# Patient Record
Sex: FEMALE | URBAN - METROPOLITAN AREA
[De-identification: names, ages, dates, MRNs, and addresses within clinical notes are randomized per-mention and may not be internally consistent; named-entity substitution may affect disease eponyms.]

---

## 2017-08-30 ENCOUNTER — EMERGENCY (EMERGENCY)
Facility: HOSPITAL | Age: 37
LOS: 1 days | Discharge: HOME | End: 2017-08-30
Attending: EMERGENCY MEDICINE | Admitting: EMERGENCY MEDICINE
Payer: COMMERCIAL

## 2017-08-30 VITALS
SYSTOLIC BLOOD PRESSURE: 144 MMHG | RESPIRATION RATE: 18 BRPM | TEMPERATURE: 99 F | HEART RATE: 92 BPM | OXYGEN SATURATION: 98 % | DIASTOLIC BLOOD PRESSURE: 98 MMHG

## 2017-08-30 PROCEDURE — 73030 X-RAY EXAM OF SHOULDER: CPT

## 2017-08-30 PROCEDURE — 99284 EMERGENCY DEPT VISIT MOD MDM: CPT | Mod: 25

## 2017-08-30 PROCEDURE — 73060 X-RAY EXAM OF HUMERUS: CPT

## 2017-08-30 PROCEDURE — 73030 X-RAY EXAM OF SHOULDER: CPT | Mod: 26,LT

## 2017-08-30 PROCEDURE — 73060 X-RAY EXAM OF HUMERUS: CPT | Mod: 26,LT

## 2017-08-30 RX ORDER — IBUPROFEN 200 MG
600 TABLET ORAL ONCE
Refills: 0 | Status: COMPLETED | OUTPATIENT
Start: 2017-08-30 | End: 2017-08-30

## 2017-08-30 RX ADMIN — Medication 600 MILLIGRAM(S): at 05:09

## 2017-08-30 NOTE — ED PROVIDER NOTE - MUSCULOSKELETAL MINIMAL EXAM
L shoulder pain with active and passive ROM, no obviouis bruising, distal pulses intact, sensation intact, difficulty abducting shoulder

## 2017-08-30 NOTE — ED PROVIDER NOTE - ATTENDING CONTRIBUTION TO CARE
pt with L shoulder pain s/p fall yesterday after she tripped and fell. did not take pain meds  on exam, no point tenderness, able to range shoulder into internal and external rotation and abduction but pain with abduction and internal rotation. No crepitus.   xray wet read without fx or dislocation   pt advised to keep arm in sling for comfort, ice, rest, and fu with sports med or ortho for likely MRI for suspected soft tissue injury.

## 2017-08-30 NOTE — ED ADULT NURSE NOTE - OBJECTIVE STATEMENT
Received patient awake and alert x 4, Presents with left arm injury, s/p slip and fall outside yesterday afternoon and landed on her left arm, pain has been progressively worsening, not relieved by pain medication. At this time, verbalized pain 8/10 on pain scale. No visible trauma noted, safety maintained, will continue to monitor.

## 2017-08-30 NOTE — ED PROVIDER NOTE - OBJECTIVE STATEMENT
36 F h/o gatroparesis, hypothyroid, s/p slip and fall outside yesterday afternoon. Had L upper arm pain that has worsened since then. No numbenss/tinling in arm. Pain is worse at shoulder, with difficulty ranging 2/2 pain. No other injuries. No back pain/neck pain/head trauma.

## 2018-03-06 NOTE — ED ADULT NURSE NOTE - NS ED NOTE ABUSE RESPONSE YN
Psychiatric Assessment    Patient: Sonam Busch Date: 3/6/2018   : 1981 Attending: No att. providers found   36 year old female      Chief complaint: \"I feel like my depression is back to where is started\"    History of Presenting Illness: Patient is a 36 year old female who presents to the [facility] clinic for an initial psychiatric evaluation with medication management. She will see Zarijuventino Palomino on 2018 as her new provider, is currently in therapy with Missy Robert.     For the past year patient is reporting increased depression and anxiety. Symptoms have worsened over the past 6 months related to her new job. 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 She has been off work for the past few weeks related to increased anxiety and panic attacks. Patient reports panic attacks have increased over the past few months related to her job and the upcoming test.     Depression symptoms include: lack of energy, some anhedonia, worry, some isolative behaviors, feeling sad/depressed. Current rating is 5/10 (10 being worst). She is taking citalopram 10 mg daily for depression and anxiety, originally saw some benefit when starting the medication, does not feel it is as beneficial now, has been taking since . Denies specific triggers or times that depression is worse, does feel her failing her test the first time is a contributor but not a trigger.    Anxiety symptoms have increased over the past 6 months. Patient is questioning her job which was created for her, she feels she never stops thinking about work even when out with friends or doing something else. 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 She is taking lorazepam 0.5 mg BID PRN for anxiety, feels this is working but reports she does not want to take a pill every time she is anxious. She is working with her therapist in  finding alternative ways to deal with anxiety. Current anxiety rating 7/10 (10 being worst). Does report work and her test as triggers but does experience anxiety at home, work and during other activities    Panic disorder symptoms have worsened over the past 6 months. Patient feels she has had panic attacks \"most of my adult life\" but they have never been \"life altering\". She reports she is frozen during her attacks, is worried about another one, can not breathe during her attacks and feels \"suffocated\" when they are happening. Lorazepam is helping with panic attacks. She has 4-5 weeks with some days including multiple. Last panic attack was 3 days ago while at home.     She works full time but is currently on a short term leave of absence. Patient is trying Cross Fit as a stress release. She forces herself to go out with friends and stay social. She continues to study to take her test again within the next few months. Denies any health concerns, follows up with her PCP.     Past Psych History: As noted in the history of present illness including AODA loss of control and trauma.  Denies any history of SI/HI, plan or intent. Denies past trials of medications. Denies self injurious behaviors. No current or past history of hallucinations, no hospitalizations for mental health.     Psychiatric ROS:   Current symptoms have been gradually worsening   Current mood is \"Up and down and Good days and bad days.\"  Appetite is \"Good.\"  Ms. Busch lost  100  pounds over the past  2 years..  Energy is \"Up and down.\" Concentration is \"Up and down and depends on the day.\" Motivation is \"ok, some days are better than others.\" Complains of some anhedonia. Obtains 7-8 hours per night without problems falling or staying asleep.  Denies suicidal ideation, plan, or intent.  Denies homicidal ideation, plan, or intent..  On a scale 1-10, 10 being the worst of symptoms and 1 being minimal, Ms. Busch rates depression as 5/10 and rates  anxiety as 7/10.  Depressive Symptoms: depressed mood, anhedonia, hopelessness, psychomotor agitation, fatigue, feelings of guilt and difficulty concentration.  Duration: at least 2 years. Judy Symptoms: denies prior and current symptoms.  Generalized Anxiety Symptoms: patient has had excessive anxiety/worry for at least 6 months, which is difficult to control, causes distress or impairment and is associated with at least 3 symptoms including: restlessness, feeling keyed up or on edge, easily fatigued, difficulty concentrating and irritability. Panic Symptoms: denies. Social Phobia Symptoms: denies symptoms. Obsessive-Compulsive Symptoms: Denies.Post-Traumatic Stress Disorder Symptoms: Denies. Psychosis Symptoms: denies or does not exhibit psychotic symptoms. Attention Deficit Hyperactivity Symptoms:  denies Eating Disorder Symptoms:  denies eating disorder symptoms. Adjustment Disorder Symptoms:  denies any adjustment disorder symptoms. Personality Symptoms: denies personality symptoms.  REVIEW OF SYSTEMS:  Constitutional:  Denies fever. Integumentary:  Denies rash or pruritus. Ears, Nose, Throat:  Denies rhinorrhea, ear pain, hearing loss, corrective lenses or sore throat. Respiratory: Denies wheezing or cough. Gastrointestinal:  Denies nausea, diarrhea or constipation. Urological:  Denies dysuria, frequency or incontinence. Cardiovascular:  Denies chest pain, palpitations or shortness of breath. Musculoskeletal:  Denies back pain, joint swelling or muscle spasms. Neurological:  Denies headache, weakness or imbalance. Hematological:  Denies gum bleeding or easy bruising.  Pertinent items are noted in the HPI  Past Medical and Current Status: The following were reviewed in the electronic record as past history and active problems:  Active Ambulatory Problems     Diagnosis Date Noted   • No Active Ambulatory Problems     Resolved Ambulatory Problems     Diagnosis Date Noted   • No Resolved Ambulatory Problems      Past Medical History:   Diagnosis Date   • Anxiety 2016   • Asthma in child    • Depression    • High blood pressure [id number]   • Multiple food allergies    • STD (female) 2002     There is no problem list on file for this patient.      Medications at Assessment: Prior to assessment medications were reviewed in the electronic record.  Current Outpatient Prescriptions   Medication Sig Dispense Refill   • citalopram (CELEXA) 10 MG tablet TAKE 1 TABLET BY MOUTH DAILY 30 tablet 5   • MAGNESIUM CITRATE PO Take 1 tablet by mouth daily.     • CALCIUM CITRATE PO Take 1 tablet by mouth daily.     • NON FORMULARY cataplex ac and g  Two tablets twice daily     • NON FORMULARY Manganese B12 one-half tab daily     • SELENIUM PO Take 1 capsule by mouth daily.     • ZINC CHELATED PO Take 1 tablet by mouth 2 times daily. Zinc liver chelate     • NON FORMULARY Thymus PMG one tablet daily     • triamcinolone (ARISTOCORT) 0.1 % cream Tiny amount sparingly on rash twice a day x 10 - 14 days prn 30 g 0   • LORazepam (ATIVAN) 0.5 MG tablet TAKE 1 TABLET BY MOUTH EVERY 8 HOURS AS NEEDED FOR ANXIETY 50 tablet 0     No current facility-administered medications for this visit.        Family History (Psych and pertinent Med): Reviewed and updated in the electronic record.  Family diseased/traits and treatment: Asked and none stated.    Social History:  Social and Sexual History reviewed with the patient and updated in the electronic record.  Social History     Social History   • Marital status: Single     Spouse name: N/A   • Number of children: N/A   • Years of education: N/A     Occupational History   •       dietician grad 2017     Social History Main Topics   • Smoking status: Current Some Day Smoker     Packs/day: 1.00     Years: 3.00   • Smokeless tobacco: Never Used   • Alcohol use No   • Drug use: No   • Sexual activity: Not on file     Other Topics Concern   •  Service No   • Blood Transfusions No   • Caffeine Concern No    • Occupational Exposure No   • Hobby Hazards No   • Sleep Concern No   • Stress Concern Yes     Has multiple stresses causing anxiety   • Weight Concern No   • Special Diet Yes     Tries to eat healthy and follows low-fat diet   • Back Care No   • Exercise Yes     Tries to walk for exercise   • Seat Belt Yes   • Self-Exams Yes     Social History Narrative    2017    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    At [employer] too    Looking for full time    No SO    occ tobacco    Reg exerciser     No Preference  Living Condition: Lives with parents who are retired    MENTAL STATUS EXAM:  Appearance:  Well-groomed, good eye contact appears stated age Behavior:  Calmed, pleasant, interactive Gait:  Normal  Cooperativity:  Cooperative, forthcoming, appears reliable  Speech:  Normal rate, tone and volume Language:  No abnormality noted  Mood:  Noted in History of Present Illness Affect:  Mood-congruent, stable, normal range Thought Process:  Linear, logical, goal-directed  Thought Content: No overt delusions or abnormality noted Perception:  No hallucinations, not responding to internal stimuli Consciousness:  Awake and alert Orientation:  Oriented to person, place and time Musculoskeletal: No abnormal or involuntary muscle movements observed Memory:  Good, able to demonstrate accurate historical recall for recent and more remote events and discussions Attention:  Good, no fluctuation or obvious deficit noted and able to follow the conversation Fund of Knowledge:  Consistent with education and experiences as evidenced by vocabulary Insight:  Good, based on appropriate recognition of impact of psychiatric symptoms and need for treatment Judgment:  Good, based on recent decisions Safety:  Noted in History of Present Illness Motivation to pursue treatment:  Good Inventory of Assets: Willing to participate in therapy      Diagnosis: F41.1 Generalized anxiety disorder  (primary encounter diagnosis)  F33.0 Major  depressive disorder, recurrent episode, mild (CMS/HCC)    Medical Decision Making/Plan of Treatment:   Increase citalopram to 20 mg daily. Patient felt \"some\" symptom reduction when initially starting this medication almost 6 months ago. Discussed possible side effects. Goal is to further decrease depression and anxiety symptoms. Script provided.    Continue lorazepam 0.5 mg BID PRN for anxiety/panic attacks. Patient reports this medication is helping, discussed tapering in the future and potential long term side effects associated with benzo use. Script provided    New provider appt: Rubi Bustamante Raphaelberhanechepe 4/13/2018 9:00 am    Continue therapy with Missy Minor: 3/21/2018 3:15 pm    Contact [facility] with questions concerns prior to new provider appointment    She agrees to call 911 or go to the nearest emergency room in the event of an emergency    This information is confidential and disclosure without patient consent or statutory authorization is prohibited by law.    LWA NP [facility]   Yes

## 2024-02-20 RX ORDER — LEVOTHYROXINE SODIUM 125 MCG
0 TABLET ORAL
Qty: 0 | Refills: 0 | DISCHARGE